# Patient Record
Sex: FEMALE | Race: WHITE | NOT HISPANIC OR LATINO | URBAN - METROPOLITAN AREA
[De-identification: names, ages, dates, MRNs, and addresses within clinical notes are randomized per-mention and may not be internally consistent; named-entity substitution may affect disease eponyms.]

---

## 2020-01-10 ENCOUNTER — OFFICE (OUTPATIENT)
Dept: URBAN - METROPOLITAN AREA CLINIC 33 | Facility: CLINIC | Age: 50
End: 2020-01-10

## 2020-01-10 VITALS
TEMPERATURE: 97 F | HEIGHT: 58 IN | DIASTOLIC BLOOD PRESSURE: 70 MMHG | HEART RATE: 60 BPM | SYSTOLIC BLOOD PRESSURE: 130 MMHG | WEIGHT: 125.8 LBS

## 2020-01-10 DIAGNOSIS — R10.13 EPIGASTRIC PAIN: ICD-10-CM

## 2020-01-10 PROCEDURE — 99244 OFF/OP CNSLTJ NEW/EST MOD 40: CPT

## 2020-01-16 ENCOUNTER — OFFICE (OUTPATIENT)
Dept: URBAN - METROPOLITAN AREA CLINIC 100 | Facility: CLINIC | Age: 50
End: 2020-01-16
Payer: COMMERCIAL

## 2020-01-16 ENCOUNTER — OFFICE (OUTPATIENT)
Dept: URBAN - METROPOLITAN AREA PATHOLOGY 17 | Facility: PATHOLOGY | Age: 50
End: 2020-01-16
Payer: COMMERCIAL

## 2020-01-16 DIAGNOSIS — K20.8 OTHER ESOPHAGITIS: ICD-10-CM

## 2020-01-16 DIAGNOSIS — K29.60 OTHER GASTRITIS WITHOUT BLEEDING: ICD-10-CM

## 2020-01-16 DIAGNOSIS — R10.13 EPIGASTRIC PAIN: ICD-10-CM

## 2020-01-16 PROBLEM — K22.8 OTHER SPECIFIED DISEASES OF ESOPHAGUS: Status: ACTIVE | Noted: 2020-01-16

## 2020-01-16 PROCEDURE — 88313 SPECIAL STAINS GROUP 2: CPT

## 2020-01-16 PROCEDURE — 88305 TISSUE EXAM BY PATHOLOGIST: CPT

## 2020-01-16 PROCEDURE — 43239 EGD BIOPSY SINGLE/MULTIPLE: CPT

## 2020-01-16 PROCEDURE — 88342 IMHCHEM/IMCYTCHM 1ST ANTB: CPT

## 2020-01-16 PROCEDURE — 88312 SPECIAL STAINS GROUP 1: CPT

## 2020-01-17 PROBLEM — K31.89 OTHER DISEASES OF STOMACH AND DUODENUM: Status: ACTIVE | Noted: 2020-01-16

## 2020-02-05 ENCOUNTER — OFFICE (OUTPATIENT)
Dept: URBAN - METROPOLITAN AREA CLINIC 33 | Facility: CLINIC | Age: 50
End: 2020-02-05
Payer: COMMERCIAL

## 2020-02-05 VITALS
HEIGHT: 58 IN | WEIGHT: 122 LBS | SYSTOLIC BLOOD PRESSURE: 113 MMHG | HEART RATE: 74 BPM | DIASTOLIC BLOOD PRESSURE: 83 MMHG | TEMPERATURE: 97.2 F

## 2020-02-05 DIAGNOSIS — K83.8 OTHER SPECIFIED DISEASES OF BILIARY TRACT: ICD-10-CM

## 2020-02-05 DIAGNOSIS — R10.11 RIGHT UPPER QUADRANT PAIN: ICD-10-CM

## 2020-02-05 DIAGNOSIS — K76.89 OTHER SPECIFIED DISEASES OF LIVER: ICD-10-CM

## 2020-02-05 PROCEDURE — 99214 OFFICE O/P EST MOD 30 MIN: CPT

## 2020-02-05 NOTE — SERVICEHPINOTES
DANGELO SÁNCHEZ   is a   50 yo white   female who is here for f/u visit for ongoing upper abdominal pain that comes on after po intake. She was last seen in office by Dr Cano in 01/10/2020 for the same complaint that led to EGD then subsequent RUQ u/s. Her 01/16/2020 EGD found mild gastritis bx benign. Her 01/24/2020 RUQ u/s noted sludge in the GB and a liver cyst measuring 3 x 5 mm. She is very frustrated at today's visit due to ongoing symptoms that have no improved with prior use of Pantoprazole 40 mg qd used for several weeks started by PCP. Also no improvement to her pain with recent trial of Dexilant. She inform of plan to d/c this Dexilant due to lack of GERD symptoms. Denies n/v, dysphagia, regurgitation, heartburn, change in bowel habits, weight loss. She will be due for CRC screening in 10/2020.

## 2020-03-04 ENCOUNTER — OFFICE (OUTPATIENT)
Dept: URBAN - METROPOLITAN AREA CLINIC 34 | Facility: CLINIC | Age: 50
End: 2020-03-04
Payer: COMMERCIAL

## 2020-03-04 VITALS
DIASTOLIC BLOOD PRESSURE: 85 MMHG | HEIGHT: 58 IN | TEMPERATURE: 97.8 F | SYSTOLIC BLOOD PRESSURE: 103 MMHG | HEART RATE: 87 BPM | WEIGHT: 119 LBS

## 2020-03-04 DIAGNOSIS — K80.20 CALCULUS OF GALLBLADDER WITHOUT CHOLECYSTITIS WITHOUT OBSTRU: ICD-10-CM

## 2020-03-04 DIAGNOSIS — R10.13 EPIGASTRIC PAIN: ICD-10-CM

## 2020-03-04 DIAGNOSIS — R63.4 ABNORMAL WEIGHT LOSS: ICD-10-CM

## 2020-03-04 LAB
COMPREHENSIVE METABOLIC PROF: ALANINE AMINOTRANS (ALT/GPT): 28 IU/L
COMPREHENSIVE METABOLIC PROF: ALBUMIN: 4.8 GM/DL
COMPREHENSIVE METABOLIC PROF: ALK PHOS,TOTAL: 69 IU/L
COMPREHENSIVE METABOLIC PROF: ANION GAP: 6
COMPREHENSIVE METABOLIC PROF: AST/SGOT ASPARTATE AMINO TRANS: 16 U/L
COMPREHENSIVE METABOLIC PROF: BILIRUBIN,TOTAL: 0.3 MG/DL
COMPREHENSIVE METABOLIC PROF: BLOOD UREA NITROGEN: 22 MG/DL — HIGH
COMPREHENSIVE METABOLIC PROF: CALCIUM LEVEL: 10 MG/DL
COMPREHENSIVE METABOLIC PROF: CARBON DIOXIDE: 26 MEQ/L
COMPREHENSIVE METABOLIC PROF: CHLORIDE LEVEL: 107 MEQ/L
COMPREHENSIVE METABOLIC PROF: CREATININE,SERUM: 0.72 MG/DL
COMPREHENSIVE METABOLIC PROF: GLOMERULAR FILTRATION RATE: > 60 ML/MIN
COMPREHENSIVE METABOLIC PROF: GLUCOSE,RANDOM: 83 MG/DL
COMPREHENSIVE METABOLIC PROF: POTASSIUM LEVEL: 4.1 MEQ/L
COMPREHENSIVE METABOLIC PROF: SODIUM LEVEL, SERUM: 139 MEQ/L
COMPREHENSIVE METABOLIC PROF: TOTAL PROTEIN: 7.9 GM/DL
LIPASE: 262 U/L
LIPASE: PERFORMING LOCATION: (no result)

## 2020-03-04 PROCEDURE — 99214 OFFICE O/P EST MOD 30 MIN: CPT | Performed by: INTERNAL MEDICINE

## 2020-09-23 PROBLEM — Z12.11 SCREENING FOR COLONIC NEOPLASIA: Status: ACTIVE | Noted: 2020-09-23

## 2020-10-22 ENCOUNTER — OFFICE (OUTPATIENT)
Dept: URBAN - METROPOLITAN AREA CLINIC 34 | Facility: CLINIC | Age: 50
End: 2020-10-22
Payer: COMMERCIAL

## 2020-10-22 DIAGNOSIS — Z11.59 ENCOUNTER FOR SCREENING FOR OTHER VIRAL DISEASES: ICD-10-CM

## 2020-10-22 PROCEDURE — 99211 OFF/OP EST MAY X REQ PHY/QHP: CPT | Mod: 25,CS | Performed by: INTERNAL MEDICINE

## 2020-10-22 PROCEDURE — 99211 OFF/OP EST MAY X REQ PHY/QHP: CPT | Mod: CS,25 | Performed by: INTERNAL MEDICINE

## 2020-10-23 VITALS
SYSTOLIC BLOOD PRESSURE: 116 MMHG | DIASTOLIC BLOOD PRESSURE: 79 MMHG | DIASTOLIC BLOOD PRESSURE: 81 MMHG | SYSTOLIC BLOOD PRESSURE: 110 MMHG | TEMPERATURE: 97.9 F | RESPIRATION RATE: 16 BRPM | DIASTOLIC BLOOD PRESSURE: 75 MMHG | TEMPERATURE: 97 F | TEMPERATURE: 97 F | HEART RATE: 76 BPM | DIASTOLIC BLOOD PRESSURE: 77 MMHG | SYSTOLIC BLOOD PRESSURE: 137 MMHG | TEMPERATURE: 97.5 F | TEMPERATURE: 97.9 F | SYSTOLIC BLOOD PRESSURE: 117 MMHG | SYSTOLIC BLOOD PRESSURE: 116 MMHG | DIASTOLIC BLOOD PRESSURE: 88 MMHG | HEART RATE: 86 BPM | RESPIRATION RATE: 14 BRPM | RESPIRATION RATE: 17 BRPM | DIASTOLIC BLOOD PRESSURE: 67 MMHG | WEIGHT: 111 LBS | HEART RATE: 71 BPM | HEART RATE: 78 BPM | SYSTOLIC BLOOD PRESSURE: 114 MMHG | RESPIRATION RATE: 17 BRPM | WEIGHT: 111 LBS | HEART RATE: 78 BPM | SYSTOLIC BLOOD PRESSURE: 98 MMHG | SYSTOLIC BLOOD PRESSURE: 117 MMHG | DIASTOLIC BLOOD PRESSURE: 79 MMHG | DIASTOLIC BLOOD PRESSURE: 81 MMHG | DIASTOLIC BLOOD PRESSURE: 75 MMHG | HEART RATE: 86 BPM | DIASTOLIC BLOOD PRESSURE: 88 MMHG | RESPIRATION RATE: 14 BRPM | HEART RATE: 77 BPM | DIASTOLIC BLOOD PRESSURE: 67 MMHG | OXYGEN SATURATION: 98 % | RESPIRATION RATE: 16 BRPM | SYSTOLIC BLOOD PRESSURE: 110 MMHG | DIASTOLIC BLOOD PRESSURE: 66 MMHG | HEIGHT: 58 IN | SYSTOLIC BLOOD PRESSURE: 98 MMHG | HEIGHT: 58 IN | HEART RATE: 90 BPM | SYSTOLIC BLOOD PRESSURE: 137 MMHG | HEART RATE: 77 BPM | TEMPERATURE: 97.5 F | OXYGEN SATURATION: 99 % | SYSTOLIC BLOOD PRESSURE: 114 MMHG | HEART RATE: 71 BPM | HEART RATE: 76 BPM | DIASTOLIC BLOOD PRESSURE: 77 MMHG | HEART RATE: 90 BPM | DIASTOLIC BLOOD PRESSURE: 66 MMHG | OXYGEN SATURATION: 99 % | OXYGEN SATURATION: 98 %

## 2020-10-26 ENCOUNTER — OFFICE (OUTPATIENT)
Dept: URBAN - METROPOLITAN AREA CLINIC 98 | Facility: CLINIC | Age: 50
End: 2020-10-26
Payer: COMMERCIAL

## 2020-10-26 DIAGNOSIS — Z12.11 ENCOUNTER FOR SCREENING FOR MALIGNANT NEOPLASM OF COLON: ICD-10-CM

## 2020-10-26 PROCEDURE — 00812 ANES LWR INTST SCR COLSC: CPT | Mod: QS,AA,P2

## 2020-10-26 PROCEDURE — 00812 ANES LWR INTST SCR COLSC: CPT | Mod: AA,P2,QS
